# Patient Record
Sex: FEMALE | Race: WHITE | NOT HISPANIC OR LATINO | Employment: UNEMPLOYED | URBAN - METROPOLITAN AREA
[De-identification: names, ages, dates, MRNs, and addresses within clinical notes are randomized per-mention and may not be internally consistent; named-entity substitution may affect disease eponyms.]

---

## 2019-05-14 PROBLEM — F31.9 BIPOLAR DISORDER: Status: ACTIVE | Noted: 2019-05-14

## 2019-08-16 PROBLEM — F19.11 SUBSTANCE ABUSE IN REMISSION: Chronic | Status: ACTIVE | Noted: 2019-08-16

## 2019-12-19 ENCOUNTER — TELEPHONE (OUTPATIENT)
Dept: PHARMACY | Facility: CLINIC | Age: 49
End: 2019-12-19

## 2019-12-19 NOTE — TELEPHONE ENCOUNTER
Informed Patient  that [facility] received prescription for Enbrel & Prednisone. She said she fills these with [facility] and would like to continue filling them there.

## 2020-01-13 PROBLEM — F31.5 BIPOLAR I DISORDER, CURRENT OR MOST RECENT EPISODE DEPRESSED, WITH PSYCHOTIC FEATURES: Status: ACTIVE | Noted: 2020-01-13

## 2020-07-07 PROBLEM — Z86.19 HISTORY OF HEPATITIS C: Status: ACTIVE | Noted: 2020-07-07

## 2020-07-07 PROBLEM — Z79.899 HIGH RISK MEDICATION USE: Status: ACTIVE | Noted: 2020-07-07

## 2020-07-07 PROBLEM — M06.9 RHEUMATOID ARTHRITIS: Status: ACTIVE | Noted: 2020-07-07

## 2020-11-22 PROBLEM — I10 ESSENTIAL HYPERTENSION: Status: ACTIVE | Noted: 2020-11-22

## 2021-04-15 PROBLEM — R07.9 CHEST PAIN: Status: ACTIVE | Noted: 2021-04-15

## 2021-04-15 PROBLEM — R53.83 FATIGUE: Status: ACTIVE | Noted: 2021-04-15

## 2021-05-12 ENCOUNTER — PATIENT MESSAGE (OUTPATIENT)
Dept: RESEARCH | Facility: HOSPITAL | Age: 51
End: 2021-05-12

## 2021-05-28 PROBLEM — Z79.631 ON METHOTREXATE THERAPY: Status: ACTIVE | Noted: 2020-07-07

## 2021-05-28 PROBLEM — S62.002A CLOSED NONDISPLACED FRACTURE OF SCAPHOID BONE OF LEFT WRIST: Status: ACTIVE | Noted: 2021-05-28

## 2021-08-12 PROBLEM — F19.94 SUBSTANCE OR MEDICATION-INDUCED BIPOLAR AND RELATED DISORDER: Status: ACTIVE | Noted: 2020-01-13

## 2021-08-12 PROBLEM — G47.00 INSOMNIA: Status: ACTIVE | Noted: 2021-08-12

## 2021-08-18 PROBLEM — S62.009A SCAPHOID FRACTURE OF WRIST: Status: ACTIVE | Noted: 2021-08-18

## 2021-10-18 ENCOUNTER — SPECIALTY PHARMACY (OUTPATIENT)
Dept: PHARMACY | Facility: CLINIC | Age: 51
End: 2021-10-18

## 2022-07-23 PROBLEM — H40.033 NARROW ANGLE GLAUCOMA SUSPECT, BILATERAL: Status: ACTIVE | Noted: 2022-07-23

## 2022-07-23 PROBLEM — H54.7: Status: ACTIVE | Noted: 2022-07-23

## 2023-03-03 ENCOUNTER — SPECIALTY PHARMACY (OUTPATIENT)
Dept: PHARMACY | Facility: CLINIC | Age: 53
End: 2023-03-03

## 2023-03-03 DIAGNOSIS — M06.9 RHEUMATOID ARTHRITIS, INVOLVING UNSPECIFIED SITE, UNSPECIFIED WHETHER RHEUMATOID FACTOR PRESENT: Primary | ICD-10-CM

## 2023-03-03 NOTE — TELEPHONE ENCOUNTER
Message received from provider requesting Urgent continuation PA. Enbrel PF syr for RA order received, prior authorization required. Pt notified prescription received and [facility] will submit PA. She reports she has not had Enbrel injection since December and flaring because she had been filling through [facility] but PA needed. Prior authorization unable to submit on CMM.    Per plan prior authorization was previously submitted and denied. Per [place] [employer] connections rep, Colin documentation of improvement was needed for PA approval. Denial letter requested to submit additional info and complete PA.

## 2023-03-08 ENCOUNTER — SPECIALTY PHARMACY (OUTPATIENT)
Dept: PHARMACY | Facility: CLINIC | Age: 53
End: 2023-03-08

## 2023-03-08 DIAGNOSIS — M06.9 RHEUMATOID ARTHRITIS, INVOLVING UNSPECIFIED SITE, UNSPECIFIED WHETHER RHEUMATOID FACTOR PRESENT: Primary | ICD-10-CM

## 2023-03-08 RX ORDER — NAPROXEN SODIUM 220 MG
220 TABLET ORAL
COMMUNITY
End: 2023-04-11

## 2023-03-08 NOTE — TELEPHONE ENCOUNTER
Enbrel for RA prior authorization approved, medicaid pt $0 copay at [facility]. Called pt to notify and schedule initial consult- pt is New to [facility] but Enbrel continuation. No answer-LVM.

## 2023-03-08 NOTE — TELEPHONE ENCOUNTER
Specialty Pharmacy - Initial Clinical Assessment    Specialty Medication Orders Linked to Encounter      Flowsheet Row Most Recent Value   Medication #1 etanercept (ENBREL) 50 mg/mL (1 mL) injection (Order#[id number], Rx#[id number])          Patient Diagnosis   M06.9 - Rheumatoid arthritis, involving unspecified site, unspecified whether rheumatoid factor present    Subjective    Siri Tinajero is a 52 y.o. female, who is followed by the specialty pharmacy service for management and education.    Recent Encounters       Date Type Provider Description    03/08/2023 Specialty Pharmacy Marcy John PharmD Initial Clinical Assessment    03/03/2023 Specialty Pharmacy Marcy John PharmD Referral Authorization    10/18/2021 Specialty Pharmacy Lg Woo PharmD           Clinical call attempts since last clinical assessment   3/8/2023  7:32 PM - Specialty Pharmacy - Clinical Assessment by Marcy John PharmD     Current Outpatient Medications   Medication Sig    naproxen sodium (ANAPROX) 220 MG tablet Take 220 mg by mouth every 12 (twelve) hours.    cetirizine (ZYRTEC) 10 MG tablet Take 1 tablet (10 mg total) by mouth once daily.    etanercept (ENBREL) 50 mg/mL (1 mL) injection Inject 1 mL (50 mg total) into the skin once a week.    fluticasone propionate (FLONASE) 50 mcg/actuation nasal spray 1 spray (50 mcg total) by Each Nostril route once daily. (Patient not taking: Reported on 8/30/2022)    folic acid (FOLVITE) 1 MG tablet Take 1 tablet (1 mg total) by mouth once daily.    hydrOXYchloroQUINE (PLAQUENIL) 200 mg tablet Take 1 tablet (200 mg total) by mouth once daily.    methotrexate 2.5 MG Tab Take 6 tablets (15 mg total) by mouth every 7 days.    sumatriptan (IMITREX) 50 MG tablet Take 1 tablet (50 mg total) by mouth every 2 (two) hours as needed for Migraine (may repeat after 2 hours for two more additional doses).    traZODone (DESYREL) 300 MG tablet Take 1 tablet (300 mg total) by mouth nightly.   Last reviewed on  7/23/2022  5:10 AM by Adam Bautista MD    Review of patient's allergies indicates:   Allergen Reactions    Iodine Hives and Itching    Adhesive     Apple     Aspirin, buffered      Nauseated and stomach cramps    Chlorhexidine     Corn containing products     Egg     Gabapentin Hives    Milk containing products     Povidone-iodine     Wheat    Last reviewed on  3/3/2023 3:48 PM by Kemi Johnston    Drug Interactions    Drug interactions evaluated: yes  Clinically relevant drug interactions identified: no  Provided the patient with educational material regarding drug interactions: not applicable         Adverse Effects    *All other systems reviewed and are negative       Assessment Questions - Documented Responses      Flowsheet Row Most Recent Value   Assessment    Medication Reconciliation completed for patient Yes   During the past 4 weeks, has patient missed any activities due to condition or medication? No   During the past 4 weeks, did patient have any of the following urgent care visits? None   Goals of Therapy Status Discussed (new start)   Status of the patients ability to self-administer: Is Able   All education points have been covered with patient? No, patient declined- printed education provided  [pt has been on enbrel 4 or 5 yrs ago]   Welcome packet contents reviewed and discussed with patient? Yes   Assesment completed? Yes   Plan Therapy continued   Do you need to open a clinical intervention (i-vent)? No   Do you want to schedule first shipment? Yes          Refill Questions - Documented Responses      Flowsheet Row Most Recent Value   Patient Availability and HIPAA Verification    Does patient want to proceed with activity? Yes   HIPAA/medical authority confirmed? Yes   Relationship to patient of person spoken to? Self   Refill Screening Questions    When does the patient need to receive the medication? 03/10/23   Refill Delivery Questions    How will the patient receive the medication? Mail  "  When does the patient need to receive the medication? 03/10/23   Shipping Address Home   Address in [facility] confirmed and updated if neccessary? Yes   Expected Copay ($) 0   Is the patient able to afford the medication copay? Yes   Payment Method zero copay   Days supply of Refill 28   Supplies needed? No supplies needed   Refill activity completed? Yes   Refill activity plan Refill scheduled   Shipment/Pickup Date: 03/09/23            Objective    She has a past medical history of Anxiety, Arthritis, and Bipolar disorder.    Tried/failed medications: mtx    BP Readings from Last 4 Encounters:   08/30/22 124/65   05/26/22 111/74   01/12/22 130/86   12/22/21 104/65     Ht Readings from Last 4 Encounters:   08/30/22 5' 5" (1.651 m)   05/26/22 5' 5" (1.651 m)   01/12/22 5' 5" (1.651 m)   12/22/21 5' 5" (1.651 m)     Wt Readings from Last 4 Encounters:   08/30/22 58.5 kg (129 lb)   05/26/22 55.9 kg (123 lb 3.2 oz)   01/12/22 58.9 kg (129 lb 14.4 oz)   12/22/21 59.4 kg (130 lb 14.4 oz)     No results for input(s): CREATININE, ALT, AST, HEPBSAG, HEPBSAB, HEPBCAB in the last 2160 hours.  The goals of rheumatoid arthritis treatment include:  Relieving pain and suppressing inflammation  Achieving remission and preventing joint and organ damage  Increasing joint mobility and strength  Preventing infection and other complications of treatment  Reducing long term complications of rheumatoid arthritis  Improving or maintaining physical function and optimal well-being  Improving or maintaining quality of life  Maintaining optimal therapy adherence  Minimizing and managing side effects    Goals of Therapy Status: Discussed (new start)    Assessment/Plan  Patient plans to continue therapy without changes      Indication, dosage, appropriateness, effectiveness, safety and convenience of her specialty medication(s) were reviewed today.     Patient Education   Pharmacist offer to  patient was declined. Printed " educational materials will be provided with medication.      Enbrel syringe for RA continuation of therapy     Tasks added this encounter   3/31/2023 - Refill Call (Auto Added)  12/8/2023 - Clinical - Follow Up Assesement (Annual)   Tasks due within next 3 months   No tasks due.     Marcy John, PharmD  [facility]  [address] [place] [address]  Phone: [phone]  Fax: [phone]

## 2023-04-03 ENCOUNTER — SPECIALTY PHARMACY (OUTPATIENT)
Dept: PHARMACY | Facility: CLINIC | Age: 53
End: 2023-04-03

## 2023-04-03 NOTE — TELEPHONE ENCOUNTER
Specialty Pharmacy - Refill Coordination    Specialty Medication Orders Linked to Encounter      Flowsheet Row Most Recent Value   Medication #1 etanercept (ENBREL) 50 mg/mL (1 mL) injection (Order#[id number], Rx#[id number])            Refill Questions - Documented Responses      Flowsheet Row Most Recent Value   Patient Availability and HIPAA Verification    Does patient want to proceed with activity? Yes   HIPAA/medical authority confirmed? Yes   Relationship to patient of person spoken to? Self   Refill Screening Questions    Changes to allergies? No   Changes to medications? No   New conditions since last clinic visit? No   Unplanned office visit, urgent care, ED, or hospital admission in the last 4 weeks? No   How does patient/caregiver feel medication is working? Good   Financial problems or insurance changes? No   How many doses of your specialty medications were missed in the last 4 weeks? 0   Would patient like to speak to a pharmacist? No   When does the patient need to receive the medication? 04/07/23   Refill Delivery Questions    How will the patient receive the medication? Mail   When does the patient need to receive the medication? 04/07/23   Shipping Address Home   Address in [facility] confirmed and updated if neccessary? Yes   Expected Copay ($) 0   Is the patient able to afford the medication copay? Yes   Payment Method zero copay   Days supply of Refill 28   Supplies needed? No supplies needed   Refill activity completed? Yes   Refill activity plan Refill scheduled   Shipment/Pickup Date: 04/04/23            Current Outpatient Medications   Medication Sig    cetirizine (ZYRTEC) 10 MG tablet Take 1 tablet (10 mg total) by mouth once daily.    etanercept (ENBREL) 50 mg/mL (1 mL) injection Inject 1 mL (50 mg total) into the skin once a week.    fluticasone propionate (FLONASE) 50 mcg/actuation nasal spray 1 spray (50 mcg total) by Each Nostril route once daily. (Patient not taking: Reported  on 8/30/2022)    folic acid (FOLVITE) 1 MG tablet Take 1 tablet (1 mg total) by mouth once daily.    hydrOXYchloroQUINE (PLAQUENIL) 200 mg tablet Take 1 tablet (200 mg total) by mouth once daily.    methotrexate 2.5 MG Tab Take 6 tablets (15 mg total) by mouth every 7 days.    naproxen sodium (ANAPROX) 220 MG tablet Take 220 mg by mouth every 12 (twelve) hours.    sumatriptan (IMITREX) 50 MG tablet Take 1 tablet (50 mg total) by mouth every 2 (two) hours as needed for Migraine (may repeat after 2 hours for two more additional doses).    traZODone (DESYREL) 300 MG tablet Take 1 tablet (300 mg total) by mouth nightly.   Last reviewed on 7/23/2022  5:10 AM by Adam Bautista MD    Review of patient's allergies indicates:   Allergen Reactions    Iodine Hives and Itching    Adhesive     Apple     Aspirin, buffered      Nauseated and stomach cramps    Chlorhexidine     Corn containing products     Egg     Gabapentin Hives    Milk containing products     Povidone-iodine     Wheat     Last reviewed on  4/3/2023 10:47 AM by Kemi Johnston      Tasks added this encounter   4/28/2023 - Refill Call (Auto Added)   Tasks due within next 3 months   No tasks due.     Negrita Doe, PharmD  [facility]  [address] [place] [address]  Phone: [phone]  Fax: [phone]

## 2023-04-04 ENCOUNTER — PATIENT MESSAGE (OUTPATIENT)
Dept: ADMINISTRATIVE | Facility: OTHER | Age: 53
End: 2023-04-04

## 2023-04-11 PROBLEM — Z00.00 PREVENTATIVE HEALTH CARE: Status: ACTIVE | Noted: 2023-04-11

## 2023-04-11 PROBLEM — M54.50 BILATERAL LOW BACK PAIN WITHOUT SCIATICA: Status: ACTIVE | Noted: 2023-04-11

## 2023-04-11 PROBLEM — Z76.89 ENCOUNTER TO ESTABLISH CARE WITH NEW DOCTOR: Status: ACTIVE | Noted: 2023-04-11

## 2023-04-11 PROBLEM — G43.909 MIGRAINE WITHOUT STATUS MIGRAINOSUS, NOT INTRACTABLE: Status: ACTIVE | Noted: 2023-04-11

## 2023-04-11 PROBLEM — Z79.899 ENCOUNTER FOR LONG-TERM CURRENT USE OF MEDICATION: Status: ACTIVE | Noted: 2023-04-11

## 2023-04-28 ENCOUNTER — SPECIALTY PHARMACY (OUTPATIENT)
Dept: PHARMACY | Facility: CLINIC | Age: 53
End: 2023-04-28

## 2023-04-28 NOTE — TELEPHONE ENCOUNTER
Specialty Pharmacy - Refill Coordination    Specialty Medication Orders Linked to Encounter      Flowsheet Row Most Recent Value   Medication #1 etanercept (ENBREL) 50 mg/mL (1 mL) injection (Order#[id number], Rx#[id number])            Refill Questions - Documented Responses      Flowsheet Row Most Recent Value   Patient Availability and HIPAA Verification    Does patient want to proceed with activity? Yes   HIPAA/medical authority confirmed? Yes   Relationship to patient of person spoken to? Self   Refill Screening Questions    Changes to allergies? No   Changes to medications? No   New conditions since last clinic visit? No   Unplanned office visit, urgent care, ED, or hospital admission in the last 4 weeks? No   How does patient/caregiver feel medication is working? Good   Financial problems or insurance changes? No   How many doses of your specialty medications were missed in the last 4 weeks? 0   Would patient like to speak to a pharmacist? No   When does the patient need to receive the medication? 05/05/23   Refill Delivery Questions    How will the patient receive the medication? Mail   When does the patient need to receive the medication? 05/05/23   Shipping Address Home   Address in [facility] confirmed and updated if neccessary? Yes   Expected Copay ($) 0   Is the patient able to afford the medication copay? Yes   Payment Method zero copay   Days supply of Refill 28   Supplies needed? No supplies needed   Refill activity completed? Yes   Refill activity plan Refill scheduled   Shipment/Pickup Date: 05/02/23            Current Outpatient Medications   Medication Sig    cetirizine (ZYRTEC) 10 MG tablet Take 1 tablet (10 mg total) by mouth once daily.    etanercept (ENBREL) 50 mg/mL (1 mL) injection Inject 1 mL (50 mg total) into the skin once a week.    folic acid (FOLVITE) 1 MG tablet Take 1 tablet (1 mg total) by mouth once daily.    hydrOXYchloroQUINE (PLAQUENIL) 200 mg tablet Take 1 tablet (200 mg  total) by mouth once daily.    methotrexate 2.5 MG Tab Take 6 tablets (15 mg total) by mouth every 7 days.    sumatriptan (IMITREX) 50 MG tablet Take 1 tablet (50 mg total) by mouth daily as needed for Migraine (may repeat after 2 hours for two more additional doses).    traZODone (DESYREL) 300 MG tablet Take 1 tablet (300 mg total) by mouth nightly.   Last reviewed on 4/11/2023  3:33 PM by Deborah Valencia NP    Review of patient's allergies indicates:   Allergen Reactions    Marijuana/cannabinoid Other (See Comments) and Shortness Of Breath    Iodine Hives and Itching    Adhesive     Apple     Aspirin, buffered      Nauseated and stomach cramps    Chlorhexidine     Corn containing products     Egg     Gabapentin Hives    Milk containing products     Povidone-iodine     Wheat     Last reviewed on  4/11/2023 3:33 PM by Deborah Valencia      Tasks added this encounter   No tasks added.   Tasks due within next 3 months   4/28/2023 - Refill Coordination Outreach (1 time occurrence)     [facility]  [address] [place] [address]  Phone: [phone]  Fax: [phone]

## 2023-05-08 ENCOUNTER — SPECIALTY PHARMACY (OUTPATIENT)
Dept: PHARMACY | Facility: CLINIC | Age: 53
End: 2023-05-08

## 2023-05-08 DIAGNOSIS — M06.9 RHEUMATOID ARTHRITIS, INVOLVING UNSPECIFIED SITE, UNSPECIFIED WHETHER RHEUMATOID FACTOR PRESENT: Primary | ICD-10-CM

## 2023-05-09 ENCOUNTER — SPECIALTY PHARMACY (OUTPATIENT)
Dept: PHARMACY | Facility: CLINIC | Age: 53
End: 2023-05-09

## 2023-05-09 NOTE — TELEPHONE ENCOUNTER
Specialty Pharmacy - Initial Clinical Assessment    Specialty Medication Orders Linked to Encounter      Flowsheet Row Most Recent Value   Medication #1 methotrexate 25 mg/mL injection (Order#[id number], Rx#[id number])          Patient Diagnosis   M06.9 - Rheumatoid arthritis    Elizabeth Tinajero is a 52 y.o. female, who is followed by the specialty pharmacy service for management and education.    Recent Encounters       Date Type Provider Description    05/09/2023 Specialty Pharmacy Dewayne Pimentel PharmD Initial Clinical Assessment    05/08/2023 Specialty Pharmacy Dewayne Pimentel PharmD Referral Authorization    04/28/2023 Specialty Pharmacy Bhumi White Refill Coordination    04/03/2023 Specialty Pharmacy Negrita Doe PharmD Refill Coordination    03/08/2023 Specialty Pharmacy Marcy John PharmD Initial Clinical Assessment            Current Outpatient Medications   Medication Sig    cetirizine (ZYRTEC) 10 MG tablet Take 1 tablet (10 mg total) by mouth once daily.    etanercept (ENBREL) 50 mg/mL (1 mL) injection Inject 1 mL (50 mg total) into the skin once a week.    folic acid (FOLVITE) 1 MG tablet Take 1 tablet (1 mg total) by mouth once daily.    hydrOXYchloroQUINE (PLAQUENIL) 200 mg tablet Take 1 tablet (200 mg total) by mouth once daily.    methotrexate 25 mg/mL injection Inject 0.6 mLs (15 mg total) into the skin every 7 days.    predniSONE (DELTASONE) 5 MG tablet Take 1 tablet (5 mg total) by mouth once daily.    sumatriptan (IMITREX) 50 MG tablet Take 1 tablet (50 mg total) by mouth daily as needed for Migraine (may repeat after 2 hours for two more additional doses).    traZODone (DESYREL) 300 MG tablet Take 1 tablet (300 mg total) by mouth nightly.   Last reviewed on 5/9/2023  3:05 PM by Dewayne Pimentel PharmD    Review of patient's allergies indicates:   Allergen Reactions    Marijuana/cannabinoid Other (See Comments) and Shortness Of Breath    Iodine Hives and  Itching    Adhesive     Apple     Aspirin, buffered      Nauseated and stomach cramps    Chlorhexidine     Corn containing products     Egg     Gabapentin Hives    Milk containing products (dairy)     Povidone-iodine     Wheat    Last reviewed on  5/9/2023 2:58 PM by Dewayne Villavicencio    Drug Interactions    Drug interactions evaluated: yes  Clinically relevant drug interactions identified: no  Provided the patient with educational material regarding drug interactions: not applicable         Adverse Effects    Joint swelling: Pos       Assessment Questions - Documented Responses      Flowsheet Row Most Recent Value   Assessment    Medication Reconciliation completed for patient Yes   During the past 4 weeks, has patient missed any activities due to condition or medication? No   During the past 4 weeks, did patient have any of the following urgent care visits? None   Goals of Therapy Status Discussed (new start)   Status of the patients ability to self-administer: Is Able   All education points have been covered with patient? No, patient declined- printed education provided  [patient has been on MTX previously]   Welcome packet contents reviewed and discussed with patient? Yes   Assesment completed? Yes   Plan Therapy being initiated   Do you need to open a clinical intervention (i-vent)? No   Do you want to schedule first shipment? Yes          Refill Questions - Documented Responses      Flowsheet Row Most Recent Value   Patient Availability and HIPAA Verification    Does patient want to proceed with activity? Yes   HIPAA/medical authority confirmed? Yes   Relationship to patient of person spoken to? Self   Refill Screening Questions    When does the patient need to receive the medication? 05/11/23   Refill Delivery Questions    How will the patient receive the medication? Mail   When does the patient need to receive the medication? 05/11/23   Shipping Address Home   Address in [facility] confirmed and updated  "if neccessary? Yes   Expected Copay ($) 0   Is the patient able to afford the medication copay? Yes   Payment Method zero copay   Days supply of Refill 21   Supplies needed? Syringes, Alcohol Swabs   Refill activity completed? Yes   Refill activity plan Refill scheduled   Shipment/Pickup Date: 05/10/23            Objective    She has a past medical history of Anxiety, Arthritis, and Bipolar disorder.    Tried/failed medications: Enbrel (current), HCQ (current), prednisone, SSZ, naproxen     BP Readings from Last 4 Encounters:   05/02/23 (!) 105/58   04/11/23 127/76   08/30/22 124/65   05/26/22 111/74     Ht Readings from Last 4 Encounters:   05/02/23 4' 7" (1.397 m)   04/11/23 4' 7" (1.397 m)   08/30/22 5' 5" (1.651 m)   05/26/22 5' 5" (1.651 m)     Wt Readings from Last 4 Encounters:   05/02/23 58.2 kg (128 lb 6.4 oz)   04/11/23 57.2 kg (126 lb)   08/30/22 58.5 kg (129 lb)   05/26/22 55.9 kg (123 lb 3.2 oz)       The goals of rheumatoid arthritis treatment include:  Relieving pain and suppressing inflammation  Achieving remission and preventing joint and organ damage  Increasing joint mobility and strength  Preventing infection and other complications of treatment  Reducing long term complications of rheumatoid arthritis  Improving or maintaining physical function and optimal well-being  Improving or maintaining quality of life  Maintaining optimal therapy adherence  Minimizing and managing side effects    Goals of Therapy Status: Discussed (new start)    Assessment/Plan  Patient plans to start therapy on 05/11/23      Indication, dosage, appropriateness, effectiveness, safety and convenience of her specialty medication(s) were reviewed today.     Patient Education   Pharmacist offer to  patient was declined. Printed educational materials will be provided with medication.  Patient did accept verbal education on the following topics:  · Expectations and possible outcomes of therapy  · Proper use, timely " administration, and missed dose management  · Storage, safe handling, and disposal      Tasks added this encounter   No tasks added.   Tasks due within next 3 months   5/11/2023 - Initial Clinical Assessment/Patient Education (1 Time Occurence)  5/8/2023 - Set up Initial Fill     Dewayne Pimentel, PharmD  [facility]  [address]  [facility] [address]  Phone: [phone]  Fax: [phone]

## 2023-05-23 ENCOUNTER — SPECIALTY PHARMACY (OUTPATIENT)
Dept: PHARMACY | Facility: CLINIC | Age: 53
End: 2023-05-23

## 2023-05-29 NOTE — TELEPHONE ENCOUNTER
Specialty Pharmacy - Refill Coordination    Specialty Medication Orders Linked to Encounter      Flowsheet Row Most Recent Value   Medication #1 etanercept (ENBREL) 50 mg/mL (1 mL) injection (Order#[id number], Rx#[id number])   Medication #2 methotrexate 25 mg/mL injection (Order#[id number], Rx#[id number])            Refill Questions - Documented Responses      Flowsheet Row Most Recent Value   Patient Availability and HIPAA Verification    Does patient want to proceed with activity? Yes   HIPAA/medical authority confirmed? Yes   Relationship to patient of person spoken to? Self   Refill Screening Questions    Changes to allergies? No   Changes to medications? No   New conditions since last clinic visit? No   Unplanned office visit, urgent care, ED, or hospital admission in the last 4 weeks? No   How does patient/caregiver feel medication is working? Good   Financial problems or insurance changes? No   How many doses of your specialty medications were missed in the last 4 weeks? 0   Would patient like to speak to a pharmacist? No   When does the patient need to receive the medication? 06/02/23   Refill Delivery Questions    How will the patient receive the medication? Mail   When does the patient need to receive the medication? 06/02/23   Shipping Address Home   Address in [facility] confirmed and updated if neccessary? Yes   Expected Copay ($) 0   Is the patient able to afford the medication copay? Yes   Payment Method zero copay   Days supply of Refill 21   Supplies needed? No supplies needed   Refill activity completed? Yes   Refill activity plan Refill scheduled   Shipment/Pickup Date: 05/31/23            Current Outpatient Medications   Medication Sig    cetirizine (ZYRTEC) 10 MG tablet Take 1 tablet (10 mg total) by mouth once daily.    etanercept (ENBREL) 50 mg/mL (1 mL) injection Inject 1 mL (50 mg total) into the skin once a week.    folic acid (FOLVITE) 1 MG tablet Take 1 tablet (1 mg total) by mouth  once daily.    hydrOXYchloroQUINE (PLAQUENIL) 200 mg tablet Take 1 tablet (200 mg total) by mouth once daily.    methotrexate 25 mg/mL injection Inject 0.6 mLs (15 mg total) into the skin every 7 days.    predniSONE (DELTASONE) 5 MG tablet Take 1 tablet (5 mg total) by mouth once daily.    sumatriptan (IMITREX) 50 MG tablet Take 1 tablet (50 mg total) by mouth daily as needed for Migraine (may repeat after 2 hours for two more additional doses).    traZODone (DESYREL) 300 MG tablet Take 1 tablet (300 mg total) by mouth nightly.   Last reviewed on 5/9/2023  3:05 PM by Dewayne Pimentel, PharmD    Review of patient's allergies indicates:   Allergen Reactions    Marijuana/cannabinoid Other (See Comments) and Shortness Of Breath    Iodine Hives and Itching    Adhesive     Apple     Aspirin, buffered      Nauseated and stomach cramps    Chlorhexidine     Corn containing products     Egg     Gabapentin Hives    Milk containing products (dairy)     Povidone-iodine     Wheat     Last reviewed on  5/9/2023 2:58 PM by Dewayne Villavicencio      Tasks added this encounter   No tasks added.   Tasks due within next 3 months   No tasks due.     [facility]  [address] [facility] [address]  Phone: [phone]  Fax: [phone]

## 2023-06-05 ENCOUNTER — SPECIALTY PHARMACY (OUTPATIENT)
Dept: PHARMACY | Facility: CLINIC | Age: 53
End: 2023-06-05

## 2023-06-12 ENCOUNTER — SPECIALTY PHARMACY (OUTPATIENT)
Dept: PHARMACY | Facility: CLINIC | Age: 53
End: 2023-06-12

## 2023-06-12 NOTE — TELEPHONE ENCOUNTER
Specialty Pharmacy - Initial Clinical Assessment    Specialty Medication Orders Linked to Encounter      Flowsheet Row Most Recent Value   Medication #1 methotrexate, PF, (OTREXUP, PF,) 15 mg/0.4 mL AtIn (Order#[id number], Rx#[id number])          Patient Diagnosis   M06.9 - Rheumatoid arthritis    Elizabeth Tinajero is a 52 y.o. female, who is followed by the specialty pharmacy service for management and education.    Recent Encounters       Date Type Provider Description    06/12/2023 Specialty Pharmacy Dewayne Pimentel PharmD Initial Clinical Assessment    06/05/2023 Specialty Pharmacy Dewayne Pimentel PharmD Referral Authorization    05/23/2023 Specialty Pharmacy Bhumi White Refill Coordination    05/09/2023 Specialty Pharmacy Dewayne Pimentel PharmD Initial Clinical Assessment    05/08/2023 Specialty Pharmacy Dewayne Pimentel PharmD Referral Authorization            Current Outpatient Medications   Medication Sig    cetirizine (ZYRTEC) 10 MG tablet Take 1 tablet (10 mg total) by mouth once daily.    etanercept (ENBREL) 50 mg/mL (1 mL) injection Inject 1 mL (50 mg total) into the skin once a week.    folic acid (FOLVITE) 1 MG tablet Take 1 tablet (1 mg total) by mouth once daily.    hydrOXYchloroQUINE (PLAQUENIL) 200 mg tablet Take 1 tablet (200 mg total) by mouth once daily.    methotrexate, PF, (OTREXUP, PF,) 15 mg/0.4 mL AtIn Inject 0.4 mLs (15 mg total) into the skin every 7 days.    predniSONE (DELTASONE) 5 MG tablet Take 1 tablet (5 mg total) by mouth once daily.    sumatriptan (IMITREX) 50 MG tablet Take 1 tablet (50 mg total) by mouth daily as needed for Migraine (may repeat after 2 hours for two more additional doses).    traZODone (DESYREL) 300 MG tablet Take 1 tablet (300 mg total) by mouth nightly.   Last reviewed on 6/12/2023  3:00 PM by Dewayne Pimentel PharmD    Review of patient's allergies indicates:   Allergen Reactions    Marijuana/cannabinoid  Other (See Comments) and Shortness Of Breath    Iodine Hives and Itching    Adhesive     Apple     Aspirin, buffered      Nauseated and stomach cramps    Chlorhexidine     Corn containing products     Egg     Gabapentin Hives    Milk containing products (dairy)     Neosporin (neomycin-polymyx)     Povidone-iodine     Wheat    Last reviewed on  6/12/2023 2:59 PM by Dewayne Villavicencio    Drug Interactions    Clinically relevant drug interactions identified: no           Assessment Questions - Documented Responses      Flowsheet Row Most Recent Value   Assessment    Medication Reconciliation completed for patient Yes   During the past 4 weeks, has patient missed any activities due to condition or medication? No   During the past 4 weeks, did patient have any of the following urgent care visits? None   Goals of Therapy Status Discussed (new start)   Status of the patients ability to self-administer: Is Able   All education points have been covered with patient? No, patient declined- printed education provided   Welcome packet contents reviewed and discussed with patient? No  [patient currently fills with [facility]]   Assesment completed? Yes   Plan Therapy being initiated   Do you need to open a clinical intervention (i-vent)? No   Do you want to schedule first shipment? Yes          Refill Questions - Documented Responses      Flowsheet Row Most Recent Value   Patient Availability and HIPAA Verification    Does patient want to proceed with activity? Yes   HIPAA/medical authority confirmed? Yes   Relationship to patient of person spoken to? Self   Refill Screening Questions    When does the patient need to receive the medication? 06/14/23   Refill Delivery Questions    How will the patient receive the medication? Mail   When does the patient need to receive the medication? 06/14/23   Shipping Address Home   Address in [facility] confirmed and updated if neccessary? Yes   Expected Copay ($) 0   Is the patient able to  "afford the medication copay? Yes   Payment Method zero copay   Days supply of Refill 28   Supplies needed? No supplies needed   Refill activity completed? Yes   Refill activity plan Refill scheduled   Shipment/Pickup Date: 06/13/23            Objective    She has a past medical history of Anxiety, Arthritis, and Bipolar disorder.    Tried/failed medications: currently on MTX and Enbrel     BP Readings from Last 4 Encounters:   05/02/23 (!) 105/58   04/11/23 127/76   08/30/22 124/65   05/26/22 111/74     Ht Readings from Last 4 Encounters:   05/02/23 4' 7" (1.397 m)   04/11/23 4' 7" (1.397 m)   08/30/22 5' 5" (1.651 m)   05/26/22 5' 5" (1.651 m)     Wt Readings from Last 4 Encounters:   05/02/23 58.2 kg (128 lb 6.4 oz)   04/11/23 57.2 kg (126 lb)   08/30/22 58.5 kg (129 lb)   05/26/22 55.9 kg (123 lb 3.2 oz)       The goals of rheumatoid arthritis treatment include:  Relieving pain and suppressing inflammation  Achieving remission and preventing joint and organ damage  Increasing joint mobility and strength  Preventing infection and other complications of treatment  Reducing long term complications of rheumatoid arthritis  Improving or maintaining physical function and optimal well-being  Improving or maintaining quality of life  Maintaining optimal therapy adherence  Minimizing and managing side effects    Goals of Therapy Status: Discussed (new start)    Assessment/Plan  Patient plans to start therapy on 06/14/23      Indication, dosage, appropriateness, effectiveness, safety and convenience of her specialty medication(s) were reviewed today.     Patient Education   Pharmacist offer to  patient was declined. Printed educational materials will be provided with medication.  Patient did accept verbal education on the following topics:  · Proper use, timely administration, and missed dose management    Tasks added this encounter   3/12/2024 - Clinical Assessment (1 year recurrence)   Tasks due within next 3 " months   6/5/2023 - Set up Initial Fill     Dewayne Pimentel, [facility]  [address]  [facility] [address]  Phone: [phone]  Fax: [phone]

## 2023-06-21 ENCOUNTER — SPECIALTY PHARMACY (OUTPATIENT)
Dept: PHARMACY | Facility: CLINIC | Age: 53
End: 2023-06-21

## 2023-06-21 NOTE — TELEPHONE ENCOUNTER
Outgoing call regarding Enbrel refill, informed pt refill too soon. Refillable on 6/22. Informed pt will follow up on 6/22 to schedule refill and delivery.

## 2023-07-05 ENCOUNTER — SPECIALTY PHARMACY (OUTPATIENT)
Dept: PHARMACY | Facility: CLINIC | Age: 53
End: 2023-07-05

## 2023-07-05 NOTE — TELEPHONE ENCOUNTER
Patient next needed injection is 7/16, informed pt that [facility] is working with her ins to get it approved and once approved [facility] will give her a call back to schedule refill.

## 2023-07-12 NOTE — TELEPHONE ENCOUNTER
Outgoing call regarding pt methotrexate. Pt is aware that we are waiting on refills from the doctor. Will follow up.

## 2023-07-17 NOTE — TELEPHONE ENCOUNTER
Specialty Pharmacy - Refill Coordination    Specialty Medication Orders Linked to Encounter      Flowsheet Row Most Recent Value   Medication #1 methotrexate, PF, (OTREXUP, PF,) 15 mg/0.4 mL AtIn (Order#[id number], Rx#[id number])            Refill Questions - Documented Responses      Flowsheet Row Most Recent Value   Patient Availability and HIPAA Verification    Does patient want to proceed with activity? Yes   HIPAA/medical authority confirmed? Yes   Relationship to patient of person spoken to? Self   Refill Screening Questions    Changes to allergies? Yes  [Loratabs and Percocets]   Changes to medications? No   New conditions since last clinic visit? No   Unplanned office visit, urgent care, ED, or hospital admission in the last 4 weeks? Yes  [Patient went to ED for high BP]   How does patient/caregiver feel medication is working? Excellent   Financial problems or insurance changes? No   How many doses of your specialty medications were missed in the last 4 weeks? 1   Would patient like to speak to a pharmacist? No   When does the patient need to receive the medication? 07/18/23   Refill Delivery Questions    How will the patient receive the medication? Mail   When does the patient need to receive the medication? 07/18/23   Shipping Address Home   Address in [facility] confirmed and updated if neccessary? Yes   Expected Copay ($) 0   Is the patient able to afford the medication copay? Yes   Payment Method zero copay   Days supply of Refill 28   Supplies needed? No supplies needed   Refill activity completed? Yes   Refill activity plan Refill scheduled   Shipment/Pickup Date: 07/17/23            Current Outpatient Medications   Medication Sig    cetirizine (ZYRTEC) 10 MG tablet Take 1 tablet (10 mg total) by mouth once daily.    etanercept (ENBREL) 50 mg/mL (1 mL) injection Inject 1 mL (50 mg total) into the skin once a week.    folic acid (FOLVITE) 1 MG tablet Take 1 tablet (1 mg total) by mouth once  daily.    hydrOXYchloroQUINE (PLAQUENIL) 200 mg tablet Take 1 tablet (200 mg total) by mouth once daily.    methotrexate, PF, (OTREXUP, PF,) 15 mg/0.4 mL AtIn Inject 0.4 mLs (15 mg total) into the skin every 7 days.    naproxen (NAPROSYN) 500 MG tablet Take 1 tablet (500 mg total) by mouth 2 (two) times daily. for 4 days    predniSONE (DELTASONE) 5 MG tablet Take 1 tablet (5 mg total) by mouth once daily.    sumatriptan (IMITREX) 50 MG tablet Take 1 tablet (50 mg total) by mouth daily as needed for Migraine (may repeat after 2 hours for two more additional doses).    traZODone (DESYREL) 300 MG tablet Take 1 tablet (300 mg total) by mouth nightly.   Last reviewed on 7/17/2023  6:31 AM by Mark Avina RN    Review of patient's allergies indicates:   Allergen Reactions    Marijuana/cannabinoid Other (See Comments) and Shortness Of Breath    Iodine Hives and Itching    Adhesive     Apple     Aspirin, buffered      Nauseated and stomach cramps    Chlorhexidine     Corn containing products     Egg     Gabapentin Hives    Milk containing products (dairy)     Neosporin (neomycin-polymyx)     Povidone-iodine     Wheat     Last reviewed on  7/17/2023 6:31 AM by Mark Avina      Tasks added this encounter   No tasks added.   Tasks due within next 3 months   No tasks due.     Dewayne Pimentel, PharmD  [facility]  [address] [place] [address]  Phone: [phone]  Fax: [phone]

## 2023-07-31 ENCOUNTER — SPECIALTY PHARMACY (OUTPATIENT)
Dept: PHARMACY | Facility: CLINIC | Age: 53
End: 2023-07-31

## 2023-07-31 NOTE — TELEPHONE ENCOUNTER
Specialty Pharmacy - Refill Coordination    Specialty Medication Orders Linked to Encounter      Flowsheet Row Most Recent Value   Medication #1 etanercept (ENBREL) 50 mg/mL (1 mL) injection (Order#[id number], Rx#[id number])            Refill Questions - Documented Responses      Flowsheet Row Most Recent Value   Patient Availability and HIPAA Verification    Does patient want to proceed with activity? Yes   HIPAA/medical authority confirmed? Yes   Relationship to patient of person spoken to? Self   Refill Screening Questions    Changes to allergies? No   Changes to medications? No   New conditions since last clinic visit? No   Unplanned office visit, urgent care, ED, or hospital admission in the last 4 weeks? No   How does patient/caregiver feel medication is working? Very good  [patient started having joint swelling recently]   Financial problems or insurance changes? No   How many doses of your specialty medications were missed in the last 4 weeks? 0   Would patient like to speak to a pharmacist? No   When does the patient need to receive the medication? 08/04/23   Refill Delivery Questions    How will the patient receive the medication? Mail   When does the patient need to receive the medication? 08/04/23   Shipping Address Home   Address in [facility] confirmed and updated if neccessary? Yes   Expected Copay ($) 0   Is the patient able to afford the medication copay? Yes   Payment Method zero copay   Days supply of Refill 28   Supplies needed? No supplies needed   Refill activity completed? Yes   Refill activity plan Refill scheduled   Shipment/Pickup Date: 08/03/23            Current Outpatient Medications   Medication Sig    cetirizine (ZYRTEC) 10 MG tablet Take 1 tablet (10 mg total) by mouth once daily.    etanercept (ENBREL) 50 mg/mL (1 mL) injection Inject 1 mL (50 mg total) into the skin once a week.    folic acid (FOLVITE) 1 MG tablet Take 1 tablet (1 mg total) by mouth once daily.     hydrOXYchloroQUINE (PLAQUENIL) 200 mg tablet Take 1 tablet (200 mg total) by mouth once daily.    methotrexate, PF, (OTREXUP, PF,) 15 mg/0.4 mL AtIn Inject 0.4 mLs (15 mg total) into the skin every 7 days.    sumatriptan (IMITREX) 50 MG tablet Take 1 tablet (50 mg total) by mouth daily as needed for Migraine (may repeat after 2 hours for two more additional doses).    traZODone (DESYREL) 300 MG tablet Take 1 tablet (300 mg total) by mouth nightly.   Last reviewed on 7/18/2023  1:04 PM by TAE Cardona    Review of patient's allergies indicates:   Allergen Reactions    Marijuana/cannabinoid Other (See Comments) and Shortness Of Breath    Iodine Hives and Itching    Adhesive     Apple     Aspirin, buffered      Nauseated and stomach cramps    Chlorhexidine     Corn containing products     Egg     Gabapentin Hives    Milk containing products (dairy)     Neosporin (neomycin-polymyx)     Povidone-iodine     Wheat     Last reviewed on  7/26/2023 2:28 PM by Luciano Aguilar      Tasks added this encounter   No tasks added.   Tasks due within next 3 months   No tasks due.     Dewayne Pimentel, PharmD  [facility]  [address] [facility] [address]  Phone: [phone]  Fax: [phone]

## 2023-08-14 ENCOUNTER — PATIENT MESSAGE (OUTPATIENT)
Dept: PHARMACY | Facility: CLINIC | Age: 53
End: 2023-08-14

## 2023-08-14 ENCOUNTER — SPECIALTY PHARMACY (OUTPATIENT)
Dept: PHARMACY | Facility: CLINIC | Age: 53
End: 2023-08-14

## 2023-08-14 NOTE — TELEPHONE ENCOUNTER
Specialty Pharmacy - Refill Coordination    Specialty Medication Orders Linked to Encounter      Flowsheet Row Most Recent Value   Medication #1 methotrexate, PF, (OTREXUP, PF,) 15 mg/0.4 mL AtIn (Order#[id number], Rx#[id number])            Refill Questions - Documented Responses      Flowsheet Row Most Recent Value   Patient Availability and HIPAA Verification    Does patient want to proceed with activity? Yes   HIPAA/medical authority confirmed? Yes   Relationship to patient of person spoken to? Self   Refill Screening Questions    Changes to allergies? No   Changes to medications? No   New conditions since last clinic visit? No   Unplanned office visit, urgent care, ED, or hospital admission in the last 4 weeks? No   How does patient/caregiver feel medication is working? Good   Financial problems or insurance changes? No   How many doses of your specialty medications were missed in the last 4 weeks? 0   Would patient like to speak to a pharmacist? No   When does the patient need to receive the medication? 08/24/23   Refill Delivery Questions    How will the patient receive the medication? MEDRx   When does the patient need to receive the medication? 08/24/23   Shipping Address Home   Address in [facility] confirmed and updated if neccessary? Yes   Expected Copay ($) 0   Is the patient able to afford the medication copay? Yes   Payment Method zero copay   Days supply of Refill 28   Supplies needed? No supplies needed   Refill activity completed? Yes   Refill activity plan Refill scheduled   Shipment/Pickup Date: 08/21/23            Current Outpatient Medications   Medication Sig    cetirizine (ZYRTEC) 10 MG tablet Take 1 tablet (10 mg total) by mouth once daily.    etanercept (ENBREL) 50 mg/mL (1 mL) injection Inject 1 mL (50 mg total) into the skin once a week.    folic acid (FOLVITE) 1 MG tablet Take 1 tablet (1 mg total) by mouth once daily.    hydrOXYchloroQUINE (PLAQUENIL) 200 mg tablet Take 1 tablet  (200 mg total) by mouth once daily.    methotrexate, PF, (OTREXUP, PF,) 15 mg/0.4 mL AtIn Inject 0.4 mLs (15 mg total) into the skin every 7 days.    sumatriptan (IMITREX) 50 MG tablet Take 1 tablet (50 mg total) by mouth daily as needed for Migraine (may repeat after 2 hours for two more additional doses).    traZODone (DESYREL) 300 MG tablet Take 1 tablet (300 mg total) by mouth nightly.   Last reviewed on 7/18/2023  1:04 PM by Axel Reyes PA    Review of patient's allergies indicates:   Allergen Reactions    Marijuana/cannabinoid Other (See Comments) and Shortness Of Breath    Iodine Hives and Itching    Adhesive     Apple     Aspirin, buffered      Nauseated and stomach cramps    Chlorhexidine     Corn containing products     Egg     Gabapentin Hives    Milk containing products (dairy)     Neosporin (neomycin-polymyx)     Povidone-iodine     Wheat     Last reviewed on  7/26/2023 2:28 PM by Luciano Aguilar      Tasks added this encounter   No tasks added.   Tasks due within next 3 months   8/17/2023 - Refill Coordination Outreach (1 time occurrence)     [facility]  [address] [facility] [address]  Phone: [phone]  Fax: [phone]

## 2023-09-11 ENCOUNTER — SPECIALTY PHARMACY (OUTPATIENT)
Dept: PHARMACY | Facility: CLINIC | Age: 53
End: 2023-09-11

## 2023-09-21 NOTE — TELEPHONE ENCOUNTER
Specialty Pharmacy - Refill Coordination    Specialty Medication Orders Linked to Encounter      Flowsheet Row Most Recent Value   Medication #1 methotrexate, PF, (OTREXUP, PF,) 15 mg/0.4 mL AtIn (Order#[id number], Rx#[id number])            Refill Questions - Documented Responses      Flowsheet Row Most Recent Value   Patient Availability and HIPAA Verification    Does patient want to proceed with activity? Yes   HIPAA/medical authority confirmed? Yes   Relationship to patient of person spoken to? Self   Refill Screening Questions    Changes to allergies? No   Changes to medications? No   New conditions since last clinic visit? No   Unplanned office visit, urgent care, ED, or hospital admission in the last 4 weeks? No   How does patient/caregiver feel medication is working? Good   Financial problems or insurance changes? No   How many doses of your specialty medications were missed in the last 4 weeks? 0   Would patient like to speak to a pharmacist? No   When does the patient need to receive the medication? 09/29/23   Refill Delivery Questions    How will the patient receive the medication? Mail   When does the patient need to receive the medication? 09/29/23   Shipping Address Home   Address in [facility] confirmed and updated if neccessary? Yes   Expected Copay ($) 0   Is the patient able to afford the medication copay? Yes   Payment Method zero copay   Days supply of Refill 28   Supplies needed? No supplies needed   Refill activity completed? Yes   Refill activity plan Refill scheduled   Shipment/Pickup Date: 09/25/23            Current Outpatient Medications   Medication Sig    cetirizine (ZYRTEC) 10 MG tablet Take 1 tablet (10 mg total) by mouth once daily.    etanercept (ENBREL) 50 mg/mL (1 mL) injection Inject 1 mL (50 mg total) into the skin once a week.    folic acid (FOLVITE) 1 MG tablet Take 1 tablet (1 mg total) by mouth once daily.    hydrOXYchloroQUINE (PLAQUENIL) 200 mg tablet Take 1 tablet  (200 mg total) by mouth once daily.    methotrexate, PF, (OTREXUP, PF,) 15 mg/0.4 mL AtIn Inject 0.4 mLs (15 mg total) into the skin every 7 days.    sumatriptan (IMITREX) 50 MG tablet Take 1 tablet (50 mg total) by mouth daily as needed for Migraine (may repeat after 2 hours for two more additional doses).    traZODone (DESYREL) 300 MG tablet Take 1 tablet (300 mg total) by mouth nightly.   Last reviewed on 7/18/2023  1:04 PM by Axel Reyes PA    Review of patient's allergies indicates:   Allergen Reactions    Marijuana/cannabinoid Other (See Comments) and Shortness Of Breath    Iodine Hives and Itching    Adhesive     Apple     Aspirin, buffered      Nauseated and stomach cramps    Chlorhexidine     Corn containing products     Egg     Gabapentin Hives    Milk containing products (dairy)     Neosporin (neomycin-polymyx)     Povidone-iodine     Wheat     Last reviewed on  7/26/2023 2:28 PM by Luciano Aguilar      Tasks added this encounter   No tasks added.   Tasks due within next 3 months   No tasks due.     Dewayne Pimentel, PharmD  [facility]  [address] [facility] [address]  Phone: [phone]  Fax: [phone]

## 2023-10-09 ENCOUNTER — PATIENT MESSAGE (OUTPATIENT)
Dept: ADMINISTRATIVE | Facility: OTHER | Age: 53
End: 2023-10-09

## 2023-10-13 PROBLEM — K08.20: Status: ACTIVE | Noted: 2023-10-13

## 2023-10-13 PROBLEM — H02.821 SEBACEOUS CYST OF RIGHT UPPER EYELID: Status: ACTIVE | Noted: 2023-10-13

## 2023-10-13 PROBLEM — K06.9 DISORDER OF GUMS: Status: ACTIVE | Noted: 2023-10-13

## 2023-10-13 PROBLEM — L98.9 LESION OF FACE: Status: ACTIVE | Noted: 2023-10-13

## 2023-12-21 PROBLEM — S42.201A CLOSED FRACTURE OF RIGHT PROXIMAL HUMERUS: Status: ACTIVE | Noted: 2023-12-21

## 2024-01-15 PROBLEM — Z00.00 ENCOUNTER FOR HEALTH MAINTENANCE EXAMINATION IN ADULT: Status: RESOLVED | Noted: 2023-04-11 | Resolved: 2024-01-15
